# Patient Record
Sex: FEMALE | Race: WHITE
[De-identification: names, ages, dates, MRNs, and addresses within clinical notes are randomized per-mention and may not be internally consistent; named-entity substitution may affect disease eponyms.]

---

## 2024-01-03 ENCOUNTER — CARE COORDINATION (OUTPATIENT)
Dept: OTHER | Facility: CLINIC | Age: 69
End: 2024-01-03

## 2024-01-03 NOTE — CARE COORDINATION
Care Transitions Initial Follow Up Call    Call within 2 business days of discharge: Yes    Patient Current Location:  Home: [address] [place] [address] [place] [address]    LPN Care Coordinator contacted the patient by telephone to perform post hospital discharge assessment. Verified name and  with patient as identifiers. Provided introduction to self, and explanation of the LPN Care Coordinator role.     Patient: Martha Chirinos Patient : 1955   MRN: [id number]  Reason for Admission: post op wound complication  Discharge Date:   RARS: No data recorded    Was this an external facility discharge? Yes, 2024  Discharge Facility: [facility]    Challenges to be reviewed by the provider   Additional needs identified to be addressed with provider: No  none               Method of communication with provider: none.    Patient is 69 yo female S/P left TKA on 23. Patient has history of Chronic DVT with coumadin therapy. Patient developed drainage in the first week post op which worsened and required returning to the ER on 23.   She was found to have a large hematoma under the surgical incision it was determined to be best treated surgically.   She first had an IVC filter placed on 23 in anticipation of stopping anticoagulant for at least one week post op.. On 2023 she had irrigation and debridement and poly exchange for total left knee and application of incisional wound VAC. She was treated with Vanco and Rocephin. Cultures were negative. Post op course was uneventful and patient discharged to home on 24 with HH PT arranged.  Patient today reports she is doing very well. Ambulating in home with walker and her adult son is assisting as needed. Reports she is tolerating diet, voiding well and denies any signs of infection or other complication. Patient reports she feels much better with less pain and very little swelling. Anticipates HH PT to start

## 2024-01-10 ENCOUNTER — CARE COORDINATION (OUTPATIENT)
Dept: OTHER | Facility: CLINIC | Age: 69
End: 2024-01-10

## 2024-01-10 NOTE — CARE COORDINATION
Care Transitions Follow Up Call    Patient Current Location:  Home: [address] [place] [address] [place] [address]    [facility] Care Coordinator contacted the patient by telephone to follow up after admission on 24.  Verified name and  with patient as identifiers.    Patient: Martha Chirinos  Patient : 1955   MRN: [id number]  Reason for Admission: post op wound infection  Discharge Date:   RARS: No data recorded    Needs to be reviewed by the provider   Additional needs identified to be addressed with provider: Yes  home health care-Patient had HH PT eval on 24 but no therapy since. She has called Orthopedic Surgeon office as HH office states they are awaiting approval of orders from Physician.  Patient also reports increased swelling of  left leg. Patient has called clinical team for direction.             Method of communication with provider: phone.    Patient is 69 yo female S/P left TKA on 23. Patient has history of Chronic DVT with coumadin therapy. Patient developed drainage in the first week post op which worsened and required returning to the ER on 23.   She was found to have a large hematoma under the surgical incision it was determined to be best treated surgically.   She first had an IVC filter placed on 23 in anticipation of stopping anticoagulant for at least one week post op.. On 2023 she had irrigation and debridement and poly exchange for total left knee and application of incisional wound VAC. She was treated with Vanco and Rocephin. Cultures were negative. Post op course was uneventful and patient discharged to home on 24 with HH PT arranged.  Patient reports she is doing well, ambulating in home with walker.Managing ADL's. Son has left to arrange new housing, but neighbors are available to assist if needed. Patient reports increased swelling of left leg states swelling is from ankle to groin. Denies any redness, heat or lumps and states no fever or

## 2024-01-11 ENCOUNTER — CARE COORDINATION (OUTPATIENT)
Dept: OTHER | Facility: CLINIC | Age: 69
End: 2024-01-11

## 2024-01-11 NOTE — CARE COORDINATION
Care Transitions Follow Up Call    Patient Current Location:  [facility] [place]  [place] [address]    [facility] Care Coordinator contacted the patient by telephone to follow up after admission on 23.  Verified name and  with patient as identifiers.    Patient: Martha Chirinos  Patient : 1955   MRN: [id number]  Reason for Admission: post op wound complication  Discharge Date:   RARS: No data recorded    Follow up call from 1/10/24 call.   Patient reports she was able to speak with surgeon's office regarding swelling on 1/10/24. Was directed to keep leg elevated as high and as much as possible to help with swelling. Patient reports she has been doing that yesterday and last night and continues today and reports decrease in swelling. Continues to deny any redness, heat or other symptoms of concern. Ambulating with RW without difficulty and managing ADL's without assistance.   Patient states she has not been able to get HH PT started due to conflicts with health insurance. Patient reports today that as of 2024 she was transitioned as an employee of [employer] and was told she would keep [employer] insurance, but received new ID card with new ID number. However she also received a new [employer] card with [facility] ID. She has tried both cards and HH cannot get approval with either insurance.   Patient agreeable for [facility] to contact [facility] manager and try to clarify insurance. Per Héctor Gavin RN, [facility] mgr  Per information received [place] will use [employer] with new ID, however if a patient was on leave at time of transition, they will use UMR benefits under [facility] until returned to work with [employer].   [facility] mgr requested information on leave etc and will verify benefits and contact information to assist to get HH PT approved.   Patient notified of above, appreciative of help and will await my call to proceed with HH verification.     Will follow up with patient once confirmation received from [facility] mgr.     2:12pm Call to

## 2024-01-24 ENCOUNTER — CARE COORDINATION (OUTPATIENT)
Dept: OTHER | Facility: CLINIC | Age: 69
End: 2024-01-24

## 2024-01-24 NOTE — CARE COORDINATION
Patient has graduated from the Transitions of Care Coordination  program on 1/24/24.  Patient/family has the ability to self-manage at this time.  Care management goals have been completed. No further Ambulatory Care Manager follow up scheduled.    Patient is doing well, has started out patient therapy and progressing with goals.      Goals Addressed                   This Visit's Progress     COMPLETED: Conditions and Symptoms   On track     Goal:   Demonstrates no signs of infection, complications or red flags in 30 days;  Review and discuss importance of monitoring and reporting red flags;  Review medications and when taken with patient to ensure understanding;  Educate patient when to call the physician or 911;  Reviewed signs and symptoms of infection  Assess for any barriers with care access or mobility needs at home    Barriers: Pain and decreased mobility related to recent surgeries  Plan for overcoming my barriers: Follow discharge instructions and POC, work with Surgeon, HH PT and ACM to control symptoms, monitor for complications and regain mobility  Confidence: 10/10  Anticipated Goal Completion Date: 6 to 8 weeks                Patient has Ambulatory Care Manager's contact information for any further questions, concerns, or needs.  Patients upcoming visits:  No future appointments.